# Patient Record
Sex: MALE | Race: WHITE
[De-identification: names, ages, dates, MRNs, and addresses within clinical notes are randomized per-mention and may not be internally consistent; named-entity substitution may affect disease eponyms.]

---

## 2022-01-20 ENCOUNTER — HOSPITAL ENCOUNTER (OUTPATIENT)
Dept: HOSPITAL 61 - KCIC | Age: 27
End: 2022-01-20
Attending: NURSE PRACTITIONER
Payer: COMMERCIAL

## 2022-01-20 DIAGNOSIS — M25.512: ICD-10-CM

## 2022-01-20 DIAGNOSIS — R07.89: Primary | ICD-10-CM

## 2022-01-20 PROCEDURE — 73030 X-RAY EXAM OF SHOULDER: CPT

## 2022-01-20 PROCEDURE — 71046 X-RAY EXAM CHEST 2 VIEWS: CPT

## 2022-01-20 NOTE — [facility]
Study: XR SHOULDER_LEFT 2+ VIEWS



Indication: Left shoulder pain.



Comparison: None.



Findings:



Alignment is anatomic. No acute or subacute fracture. Normal acromiohumeral interval. No early arthro
sis.



Impression:



Unremarkable radiographs of the left shoulder.



Electronically signed by: MARIA FERNANDA HALE MD (1/20/2022 10:51 AM) [facility]